# Patient Record
Sex: FEMALE | Race: WHITE
[De-identification: names, ages, dates, MRNs, and addresses within clinical notes are randomized per-mention and may not be internally consistent; named-entity substitution may affect disease eponyms.]

---

## 2021-07-27 NOTE — ED GENERAL
General


Chief Complaint:  Cough/Cold/Flu Symptoms


Stated Complaint:  HEADACHE, FEVER


Nursing Triage Note:  


TO ED VIA POV AND AMBULATORY TO TRIAGE WITH C/O H/A AND FEVER 100.8 EARLIER 


TODAY. TOOK EXEDRIN (APPROX 3H PTA) WITH RELIEF OF H/A AND FEVER, BUT STILL 


FEELS LETHARGIC/TIRED/FLU LIKE SX.


Source of Information:  Patient


Exam Limitations:  No Limitations





History of Present Illness


Date Seen by Provider:  Jul 27, 2021


Time Seen by Provider:  21:14


Initial Comments


To ER with headache fever up to 100.8. Took Excedrin and feels better.


Timing/Duration:  1-2 Days


Severity:  Moderate


Associated Systoms:  Denies Symptoms





Allergies and Home Medications


Patient Home Medication List


Home Medication List Reviewed:  Yes





Review of Systems


Review of Systems


Constitutional:  see HPI


EENTM:  see HPI


Respiratory:  no symptoms reported


Cardiovascular:  no symptoms reported


Genitourinary:  no symptoms reported


Musculoskeletal:  no symptoms reported


Skin:  no symptoms reported


Psychiatric/Neurological:  No Symptoms Reported





Past Medical-Social-Family Hx


Patient Social History


Tobacco Use?:  Yes


Use of E-Cig and/or Vaping dev:  Yes


Substance use?:  Yes


Substance type:  Marijuana


Alcohol Use?:  Yes


Alcohol Frequency:  Couple times a week





Immunizations Up To Date


COVID19 Vaccine :  NO VAX





Physical Exam


Vital Signs





Vital Signs - First Documented








 7/27/21





 20:46


 


Temp 36.9


 


Pulse 108


 


Resp 18


 


B/P (MAP) 124/82 (96)


 


Pulse Ox 95


 


O2 Delivery Room Air





Capillary Refill : Less Than 3 Seconds


Height, Weight, BMI


Height: '"


Weight: lbs. oz. kg; 30.00 BMI


Method:


General Appearance:  No Apparent Distress, WD/WN


Eyes:  Bilateral Eye Normal Inspection, Bilateral Eye PERRL, Bilateral Eye EOMI


Neck:  Full Range of Motion, Normal Inspection, Non Tender


Respiratory:  Normal Breath Sounds, No Accessory Muscle Use, No Respiratory 

Distress


Cardiovascular:  Regular Rate, Rhythm, Normal Peripheral Pulses


Gastrointestinal:  Normal Bowel Sounds, Non Tender, Soft


Extremity:  Normal Capillary Refill, Normal Inspection


Neurologic/Psychiatric:  Alert, Oriented x3


Skin:  Normal Color, Warm/Dry





Progress/Results/Core Measures


Suspected Sepsis


SIRS


Temperature: 


Pulse: 108 


Respiratory Rate: 18


 


Blood Pressure 124 /82 


Mean: 96





Results/Orders


Lab Results





Laboratory Tests








Test


 7/27/21


20:40 Range/Units


 


 


SARS-CoV-2 RNA (RT-PCR) Detected H Not Detecte  








My Orders





Orders - HAL DOLAN


Covid 19 Inhouse Test (7/27/21 19:24)





Vital Signs/I&O











 7/27/21 7/27/21





 20:46 20:50


 


Temp 36.9 


 


Pulse 108 


 


Resp 18 


 


B/P (MAP) 124/82 (96) 


 


Pulse Ox 95 


 


O2 Delivery Room Air Room Air





Capillary Refill : Less Than 3 Seconds








Blood Pressure Mean:                    96











Departure


Impression





   Primary Impression:  


   COVID-19


Disposition:  01 HOME, SELF-CARE


Condition:  Stable





Departure-Patient Inst.


Decision time for Depature:  21:21


Patient Instructions:  COVID-19 ED





Add. Discharge Instructions:  


Return to ER for any trouble breathing





All discharge instructions reviewed with patient and/or family. Voiced 

understanding.











HAL DOLAN             Jul 27, 2021 21:14

## 2022-10-06 ENCOUNTER — HOSPITAL ENCOUNTER (EMERGENCY)
Dept: HOSPITAL 75 - ER | Age: 32
Discharge: HOME | End: 2022-10-06
Payer: MEDICAID

## 2022-10-06 VITALS — HEIGHT: 69.69 IN | WEIGHT: 180.78 LBS | BODY MASS INDEX: 26.17 KG/M2

## 2022-10-06 VITALS — DIASTOLIC BLOOD PRESSURE: 72 MMHG | SYSTOLIC BLOOD PRESSURE: 117 MMHG

## 2022-10-06 DIAGNOSIS — M23.91: Primary | ICD-10-CM

## 2022-10-06 DIAGNOSIS — M25.461: ICD-10-CM

## 2022-10-06 DIAGNOSIS — Z28.310: ICD-10-CM
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
## 2022-10-06 DIAGNOSIS — F17.210: ICD-10-CM

## 2022-10-06 PROCEDURE — 99283 EMERGENCY DEPT VISIT LOW MDM: CPT
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
## 2022-10-18 ENCOUNTER — HOSPITAL ENCOUNTER (OUTPATIENT)
Dept: HOSPITAL 75 - ORTHO | Age: 32
End: 2022-10-18
Attending: ORTHOPAEDIC SURGERY
Payer: MEDICAID

## 2022-10-18 DIAGNOSIS — S83.004A: Primary | ICD-10-CM
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
## 2022-10-18 PROCEDURE — 99203 OFFICE O/P NEW LOW 30 MIN: CPT
